# Patient Record
Sex: FEMALE | Race: BLACK OR AFRICAN AMERICAN | NOT HISPANIC OR LATINO | Employment: PART TIME | URBAN - METROPOLITAN AREA
[De-identification: names, ages, dates, MRNs, and addresses within clinical notes are randomized per-mention and may not be internally consistent; named-entity substitution may affect disease eponyms.]

---

## 2017-10-16 ENCOUNTER — OFFICE VISIT (OUTPATIENT)
Dept: INTERNAL MEDICINE | Facility: CLINIC | Age: 22
End: 2017-10-16
Payer: COMMERCIAL

## 2017-10-16 VITALS
WEIGHT: 128.31 LBS | HEIGHT: 66 IN | TEMPERATURE: 99 F | BODY MASS INDEX: 20.62 KG/M2 | HEART RATE: 60 BPM | DIASTOLIC BLOOD PRESSURE: 64 MMHG | SYSTOLIC BLOOD PRESSURE: 110 MMHG

## 2017-10-16 DIAGNOSIS — J02.9 PHARYNGITIS, UNSPECIFIED ETIOLOGY: Primary | ICD-10-CM

## 2017-10-16 LAB — DEPRECATED S PYO AG THROAT QL EIA: NEGATIVE

## 2017-10-16 PROCEDURE — 87147 CULTURE TYPE IMMUNOLOGIC: CPT

## 2017-10-16 PROCEDURE — 87880 STREP A ASSAY W/OPTIC: CPT

## 2017-10-16 PROCEDURE — 87081 CULTURE SCREEN ONLY: CPT

## 2017-10-16 PROCEDURE — 99203 OFFICE O/P NEW LOW 30 MIN: CPT | Mod: S$GLB,,, | Performed by: NURSE PRACTITIONER

## 2017-10-16 PROCEDURE — 99999 PR PBB SHADOW E&M-NEW PATIENT-LVL IV: CPT | Mod: PBBFAC,,, | Performed by: NURSE PRACTITIONER

## 2017-10-16 RX ORDER — CLINDAMYCIN HYDROCHLORIDE 300 MG/1
CAPSULE ORAL
Refills: 0 | COMMUNITY
Start: 2017-09-25 | End: 2017-10-18 | Stop reason: ALTCHOICE

## 2017-10-16 NOTE — PROGRESS NOTES
"Subjective:       Patient ID: Libia Smith is a 22 y.o. female.    Chief Complaint: Sore Throat    Pt here c/o sore throat x 2 days.  Pt states that she was recently treated about a month ago for strep through [facility].  Pt states at that time no culture obtained.  Pt placed on Clindamycin and states that she took all doses.  Pt states that she felt feverish 2d ago but did not have a thermometer to take her temp.  None today.  Some cough that started 2 days ago. Denies n/v/d.  Pt has been taking OTC Ibuprofen        Sore Throat    Associated symptoms include coughing. Pertinent negatives include no abdominal pain, congestion, diarrhea, neck pain, shortness of breath or vomiting.     No past medical history on file.  No past surgical history on file.  Social History     Social History Narrative    No narrative on file     No family history on file.  Vitals:    10/16/17 1418   BP: 110/64   Pulse: 60   Temp: 98.9 °F (37.2 °C)   Weight: 58.2 kg (128 lb 4.9 oz)   Height: 5' 6" (1.676 m)   PainSc:   8   PainLoc: Throat     Outpatient Encounter Prescriptions as of 10/16/2017   Medication Sig Dispense Refill    clindamycin (CLEOCIN) 300 MG capsule   0     No facility-administered encounter medications on file as of 10/16/2017.      Wt Readings from Last 3 Encounters:   10/16/17 58.2 kg (128 lb 4.9 oz)     Last 3 sets of Vitals    Vitals - 1 value per visit 10/16/2017   SYSTOLIC 110   DIASTOLIC 64   PULSE 60   TEMPERATURE 98.9   Weight (lb) 128.31   Weight (kg) 58.2   HEIGHT 5' 6"   BODY MASS INDEX 20.71   VISIT REPORT -   Pain Score  8     No results found for: CBC  Review of Systems   Constitutional: Positive for chills. Negative for fatigue, fever and unexpected weight change.   HENT: Positive for sore throat. Negative for congestion, nosebleeds and postnasal drip.    Respiratory: Positive for cough. Negative for chest tightness and shortness of breath.    Cardiovascular: Negative for chest pain. "   Gastrointestinal: Negative for abdominal pain, constipation, diarrhea, nausea and vomiting.   Musculoskeletal: Negative for arthralgias, myalgias, neck pain and neck stiffness.   Skin: Negative for rash.   Hematological: Negative for adenopathy.   Psychiatric/Behavioral: Negative for sleep disturbance.       Objective:      Physical Exam   Constitutional: She is oriented to person, place, and time. She appears well-developed and well-nourished. No distress.   HENT:   Head: Normocephalic.   Right Ear: External ear normal.   Left Ear: External ear normal.   Mouth/Throat: Uvula is midline, oropharynx is clear and moist and mucous membranes are normal. Tonsils are 2+ on the right. Tonsils are 2+ on the left.   Eyes: Conjunctivae are normal. Pupils are equal, round, and reactive to light. Right eye exhibits no discharge. Left eye exhibits no discharge.   Neck: Normal range of motion. Neck supple.   Cardiovascular: Normal rate, regular rhythm, normal heart sounds and intact distal pulses.    Pulmonary/Chest: Effort normal and breath sounds normal. No stridor. No respiratory distress.   Abdominal: Soft.   Lymphadenopathy:     She has cervical adenopathy.   Neurological: She is alert and oriented to person, place, and time.   Skin: Skin is warm and dry. Capillary refill takes less than 2 seconds. No rash noted. She is not diaphoretic. No erythema. No pallor.   Psychiatric: She has a normal mood and affect. Her behavior is normal. Judgment and thought content normal.   Nursing note and vitals reviewed.      Assessment:       1. Pharyngitis, unspecified etiology        Plan:       Recommended Ibuprofen 600 mg every 6-8 hours  Chloraseptic spray  Will call with results of tests  Libia was seen today for sore throat.    Diagnoses and all orders for this visit:    Pharyngitis, unspecified etiology  -     Throat Screen, Rapid      Patient Instructions     Pharyngitis (Sore Throat), Report Pending    Pharyngitis (sore throat) is  often due to a virus. It can also be caused by the streptococcus, or strep, bacterium, often called strep throat. Both viral and strep infections can cause throat pain that is worse when swallowing, aching all over with headache, and fever. Both types of infections are contagious. They may be spread by coughing, kissing, or touching others after touching your mouth or nose.  A test has been done to find out whether you (or your child, if your child is the patient) have strep throat. Call this facility or your healthcare provider if you were not given your test results. If the test is positive for strep infection, you will need to take antibiotic medicines. A prescription can be called into your pharmacy at that time. If the test is negative, you probably have a viral pharyngitis. This does not need to be treated with antibiotics. Until you receive the results of the strep test, you should stay home from work. If your child is being tested, he or she should stay home from school.  Home care  · Rest at home. Drink plenty of fluids so you won't get dehydrated.  · If the test is positive for strep, don't go to work or school for the first 2 days of taking the antibiotics. After this time, you will not be contagious. You can then return to work or school if you are feeling better.   · Take the antibiotic medicine for the full 10 days, even if you feel better. This is very important to make sure the infection is treated. It is also important to prevent drug-resistant germs from developing. If you were given an antibiotic shot, you won't need more antibiotics.  · For children: Use acetaminophen for fever, fussiness, or discomfort. In infants older than 6 months of age, you may use ibuprofen instead of acetaminophen. Talk with your child's healthcare provider before giving these medicines if your child has chronic liver or kidney disease or ever had a stomach ulcer or GI bleeding. Never give aspirin to a child under 18  years of age who is ill with a fever. It may cause severe liver damage.  · For adults: Use acetaminophen or ibuprofen to control pain or fever, unless another medicine was prescribed for this. Talk with your healthcare provider before taking these medicines if you have chronic liver or kidney disease or ever had a stomach ulcer or GI bleeding.  · Use throat lozenges or numbing throat sprays to help reduce pain. Gargling with warm salt water will also help reduce throat pain. For this, dissolve 1/2 teaspoon of salt in 1 glass of warm water. To help soothe a sore throat, children can sip on juice or a popsicle. Children 5 years and older can also suck on a lollipop or hard candy.  · Don't eat salty or spicy foods. These can irritate the throat.  Follow-up care  Follow up with your healthcare provider or our staff if you don't get better over the next week.  When to seek medical advice  Call your healthcare provider right away if any of these occur:  · Fever as directed by your healthcare provider. For children, seek care if:  ¨ Your child is of any age and has repeated fevers above 104°F (40°C).  ¨ Your child is younger than 2 years of age and has a fever of 100.4°F (38°C) that continues for more than 1 day.  ¨ Your child is 2 years old or older and has a fever of 100.4°F (38°C) that continues for more than 3 days.  · New or worsening ear pain, sinus pain, or headache  · Painful lumps in the back of neck  · Stiff neck  · Lymph nodes are getting larger  · Inability to swallow liquids, excessive drooling, or inability to open mouth wide due to throat pain  · Signs of dehydration (very dark urine or no urine, sunken eyes, dizziness)  · Trouble breathing or noisy breathing  · Muffled voice  · New rash  · Child appears to be getting sicker  Date Last Reviewed: 4/13/2015  © 0531-9933 [employer]. [address] [place] [address], [place] [address]. All rights reserved. This information is not intended as a substitute  for professional medical care. Always follow your healthcare professional's instructions.

## 2017-10-16 NOTE — PATIENT INSTRUCTIONS

## 2017-10-18 ENCOUNTER — OFFICE VISIT (OUTPATIENT)
Dept: FAMILY MEDICINE | Facility: CLINIC | Age: 22
End: 2017-10-18
Payer: COMMERCIAL

## 2017-10-18 VITALS
TEMPERATURE: 98 F | DIASTOLIC BLOOD PRESSURE: 62 MMHG | HEART RATE: 80 BPM | HEIGHT: 67 IN | SYSTOLIC BLOOD PRESSURE: 90 MMHG | BODY MASS INDEX: 19.93 KG/M2 | WEIGHT: 127 LBS

## 2017-10-18 DIAGNOSIS — Z23 NEED FOR PROPHYLACTIC VACCINATION AND INOCULATION AGAINST INFLUENZA: ICD-10-CM

## 2017-10-18 DIAGNOSIS — J30.89 ENVIRONMENTAL AND SEASONAL ALLERGIES: ICD-10-CM

## 2017-10-18 DIAGNOSIS — Z00.00 ANNUAL PHYSICAL EXAM: Primary | ICD-10-CM

## 2017-10-18 DIAGNOSIS — Z23 NEED FOR TDAP VACCINATION: ICD-10-CM

## 2017-10-18 DIAGNOSIS — J02.9 ACUTE PHARYNGITIS, UNSPECIFIED ETIOLOGY: ICD-10-CM

## 2017-10-18 LAB — BACTERIA THROAT CULT: NORMAL

## 2017-10-18 PROCEDURE — 90686 IIV4 VACC NO PRSV 0.5 ML IM: CPT | Mod: S$GLB,,, | Performed by: INTERNAL MEDICINE

## 2017-10-18 PROCEDURE — 90472 IMMUNIZATION ADMIN EACH ADD: CPT | Mod: S$GLB,,, | Performed by: INTERNAL MEDICINE

## 2017-10-18 PROCEDURE — 90471 IMMUNIZATION ADMIN: CPT | Mod: S$GLB,,, | Performed by: INTERNAL MEDICINE

## 2017-10-18 PROCEDURE — 99999 PR PBB SHADOW E&M-EST. PATIENT-LVL III: CPT | Mod: PBBFAC,,, | Performed by: INTERNAL MEDICINE

## 2017-10-18 PROCEDURE — 99395 PREV VISIT EST AGE 18-39: CPT | Mod: 25,S$GLB,, | Performed by: INTERNAL MEDICINE

## 2017-10-18 PROCEDURE — 90715 TDAP VACCINE 7 YRS/> IM: CPT | Mod: S$GLB,,, | Performed by: INTERNAL MEDICINE

## 2017-10-18 NOTE — PROGRESS NOTES
Two patient identifiers verified.  Allergies reviewed.Tdap given  IM administered to right deltoid and Flu vaccin given IM per MD order.  Patient tolerated injection well; no redness, bleeding, or bruising noted to injection site.  Patient instructed to remain in clinic setting for 15 minutes.  Verbalizes understanding.  Flu consent signed by patient.

## 2017-10-18 NOTE — PROGRESS NOTES
Subjective:        Patient ID: Libia Smith is a 22 y.o. female.    Chief Complaint: Establish Care    HPI   Libia Smith presents for annual exam and to establish care.    Pt was seen 2 days ago for acute pharyngitis.  She reports sore throat, congestion, pus coating both tonsils, tender swollen LNs in neck.  Sx are significantly improved today and it appears that the pus on the tonsils has decreased.  She has been taking OTC Ibuprofen and OTC supplements to treat her sx.    Review of Systems   Constitutional: Negative for activity change and unexpected weight change.   HENT: Positive for congestion, ear pain (right, acute associated with pharyngitis), sore throat and trouble swallowing. Negative for drooling and ear discharge.    Eyes: Negative for visual disturbance.   Respiratory: Positive for cough. Negative for chest tightness, shortness of breath and stridor.    Cardiovascular: Negative for chest pain and leg swelling.   Gastrointestinal: Negative for abdominal pain, blood in stool, constipation, diarrhea and vomiting.   Genitourinary: Negative for difficulty urinating, hematuria, menstrual problem (regular monthly periods) and vaginal discharge.   Musculoskeletal: Negative for neck pain.   Skin: Negative for rash.   Allergic/Immunologic: Positive for environmental allergies.   Neurological: Positive for headaches.   Psychiatric/Behavioral: Negative for dysphoric mood. The patient is not nervous/anxious.            Objective:        Vitals:    10/18/17 1012   BP: 90/62   Pulse: 80   Temp: 98 °F (36.7 °C)     Physical Exam   Constitutional: She is oriented to person, place, and time. She appears well-developed and well-nourished. No distress.   HENT:   Head: Normocephalic and atraumatic.   Right Ear: External ear normal.   Left Ear: External ear normal.   Nose: Nose normal.   - bilateral ear canals clear, tympanic membranes visualized - normal color and light reflex  - b/l tonsils 2+,  symmetrical, erythematous, minimal white exudate on L tonsil   Eyes: Conjunctivae and EOM are normal.   Neck: Normal range of motion. Neck supple. No thyromegaly present.   Cardiovascular: Normal rate, regular rhythm and normal heart sounds.    No murmur heard.  Pulmonary/Chest: Effort normal and breath sounds normal. No respiratory distress.   Abdominal: Soft. Bowel sounds are normal. She exhibits no distension and no mass. There is no tenderness. There is no guarding.   Musculoskeletal: She exhibits no edema.   Lymphadenopathy:     She has cervical adenopathy (multiple tender 1-2cm LNs in anterior cervical chain b/l).   Neurological: She is alert and oriented to person, place, and time.   Skin: Skin is warm and dry. No erythema.   - scaly patches on abdomen: periumbilical, LLQ; no erythema, skin intact   Psychiatric: She has a normal mood and affect. Her behavior is normal. Judgment and thought content normal.   Vitals reviewed.          Assessment:         1. Annual physical exam    2. Need for prophylactic vaccination and inoculation against influenza    3. Need for Tdap vaccination    4. Environmental and seasonal allergies    5. Acute pharyngitis, unspecified etiology              Plan:         Libia was seen today for establish care.    Diagnoses and all orders for this visit:    Annual physical exam  - flu shot and TDaP today  - Pt has never had HPV vaccine, discussed vaccinations and indications, gave pt handout with vaccine info and advised her to follow up if she's interested in getting the vaccine or has questions.  - Pt reports hx of pap smear ~1 year ago; reviewed cervical CA screening guidelines.  - pt young, healthy, no indications for routine lab work at this time.    Need for prophylactic vaccination and inoculation against influenza  -     Flu Vaccine - Quadrivalent (PF) (3 years & older)    Need for Tdap vaccination  -     Tdap Vaccine    Environmental and seasonal allergies: Pt treats with OTC  supplements including echinacea, nasal saline washes.    Acute pharyngitis, unspecified etiology: Improving.  Continue sx treatment; counseled pt LNs should resolve after pharyngitis resolves, if not follow up.          Return in 1  year for annual exam or sooner PRN.

## 2017-12-11 ENCOUNTER — OFFICE VISIT (OUTPATIENT)
Dept: INTERNAL MEDICINE | Facility: CLINIC | Age: 22
End: 2017-12-11
Payer: COMMERCIAL

## 2017-12-11 VITALS
HEART RATE: 68 BPM | BODY MASS INDEX: 20.9 KG/M2 | RESPIRATION RATE: 15 BRPM | SYSTOLIC BLOOD PRESSURE: 110 MMHG | WEIGHT: 130.06 LBS | HEIGHT: 66 IN | TEMPERATURE: 98 F | DIASTOLIC BLOOD PRESSURE: 60 MMHG

## 2017-12-11 DIAGNOSIS — Z87.09 HX OF EXTRINSIC ASTHMA: ICD-10-CM

## 2017-12-11 DIAGNOSIS — Z71.84 COUNSELING FOR TRAVEL: Primary | ICD-10-CM

## 2017-12-11 PROCEDURE — 99999 PR PBB SHADOW E&M-EST. PATIENT-LVL IV: CPT | Mod: PBBFAC,,, | Performed by: INTERNAL MEDICINE

## 2017-12-11 PROCEDURE — 99214 OFFICE O/P EST MOD 30 MIN: CPT | Mod: S$GLB,,, | Performed by: INTERNAL MEDICINE

## 2017-12-11 RX ORDER — ALBUTEROL SULFATE 90 UG/1
2 AEROSOL, METERED RESPIRATORY (INHALATION) EVERY 6 HOURS PRN
Qty: 18 G | Refills: 0 | Status: SHIPPED | OUTPATIENT
Start: 2017-12-11 | End: 2021-11-10

## 2017-12-11 NOTE — PROGRESS NOTES
Subjective:       Patient ID: Libia Smith is a 22 y.o. female.    Chief Complaint: forms to be completed    HPI   22 y.o. female here for study abroad physical.  She is traveling to [facility] for the spring semester.  She denies any current complaints.  She is wondering about vaccines needed for travel.  She had her annual with Dr. Calvo in October.      Chronic medical issues:  Asthma: no exacerbation in many years, denies any wheezing, SOB or Cp.  She is wondering about using a inhaler in [facility].      Health Maintenance    Vaccines: Influenza UTD  Tetanus 2017         Past Medical History:   Diagnosis Date    Environmental and seasonal allergies       History reviewed. No pertinent surgical history.  Social History     Social History    Marital status: Single     Spouse name: N/A    Number of children: N/A    Years of education: N/A     Occupational History    Not on file.     Social History Main Topics    Smoking status: Never Smoker    Smokeless tobacco: Never Used      Comment: Tried smoking kirby year of college    Alcohol use No    Drug use: Unknown    Sexual activity: Not on file     Other Topics Concern    Not on file     Social History Narrative    [employer] at [employer]    Originally from [place]    Single; no children    Regular exercise     Review of patient's allergies indicates:  No Known Allergies  Ms. Smith had no medications administered during this visit.          Review of Systems   Constitutional: Negative for activity change, chills, fever and unexpected weight change.   HENT: Negative for congestion, hearing loss, rhinorrhea, sinus pain, sinus pressure, sore throat and trouble swallowing.    Eyes: Negative for pain, discharge, redness and visual disturbance.   Respiratory: Negative for cough, chest tightness, shortness of breath and wheezing.    Cardiovascular: Negative for chest pain and palpitations.   Gastrointestinal: Negative for abdominal pain, blood in stool,  constipation, diarrhea, nausea and vomiting.   Endocrine: Negative for polydipsia and polyuria.   Genitourinary: Negative for difficulty urinating, dysuria, hematuria and menstrual problem.   Musculoskeletal: Negative for arthralgias, joint swelling and neck pain.   Skin: Negative for rash.   Neurological: Negative for dizziness, weakness, light-headedness and headaches.   Psychiatric/Behavioral: Negative for confusion, dysphoric mood and sleep disturbance.     Objective:     Vitals:    12/11/17 1431   BP: 110/60   Pulse: 68   Resp: 15   Temp: 98.2 °F (36.8 °C)    Body mass index is 20.99 kg/m².  Physical Exam   Constitutional: She is oriented to person, place, and time. She appears well-developed and well-nourished.   HENT:   Head: Normocephalic and atraumatic.   Mouth/Throat: Oropharynx is clear and moist.   Eyes: Conjunctivae are normal. Pupils are equal, round, and reactive to light.   Neck: Normal range of motion. No tracheal deviation present. No thyromegaly present.   Cardiovascular: Normal rate, regular rhythm, normal heart sounds and intact distal pulses.  Exam reveals no gallop and no friction rub.    No murmur heard.  Pulmonary/Chest: Effort normal and breath sounds normal. She has no wheezes. She has no rales.   Abdominal: Soft. Bowel sounds are normal. There is no tenderness. There is no guarding.   Musculoskeletal: She exhibits no edema.   Lymphadenopathy:     She has no cervical adenopathy.   Neurological: She is alert and oriented to person, place, and time.   Skin: Skin is warm and dry. No rash noted.   Psychiatric: She has a normal mood and affect. Judgment normal.     Assessment:     1. Counseling for travel    2. Hx of extrinsic asthma      Plan:   1. Counseling for travel  - Ambulatory Referral to Travel Clinic  - paperwork filled out for study abroad    2. Hx of extrinsic asthma  - albuterol 90 mcg/actuation inhaler; Inhale 2 puffs into the lungs every 6 (six) hours as needed for Wheezing.  Rescue  Dispense: 18 g; Refill: 0        No follow up on file

## 2017-12-11 NOTE — PATIENT INSTRUCTIONS
Healthy Eating While Traveling  Eating a healthy diet when you are traveling can be difficult. It is important to plan ahead. Below are strategies to help you.  Hotels  · Order healthy breakfast foods individually (yuli pepe). This will help you avoid the temptations at the breakfast buffet.  · Have breakfast in your room. Bring fresh fruit and mini-boxes of cereal. You can keep a small container of  low-fat or skim milk cool in the ice bucket overnight.  Car travel  If you have a long ride ahead, buy snacks before the trip. This will help you avoid the high-fat food choices on the road. Some good snack options include:  · Pre-washed fruit (apples, pears, bananas, or grapes)  · Whole-grain crackers or pretzels  · Whole-grain bagels  · Plain popcorn  · Fig bars  · Low-fat or nonfat yogurt  · Baby carrots or ready-cut raw veggies  · Dried fruit  · String cheese  · Unsalted, raw nuts or seeds  · Homemade trail mix  Airplane travel  · Think ahead. Pack healthy snacks that you can carry through security. Most of the items listed above can be packed in your carry-on bag.  · If you are not able to pack healthy options, buy healthy food at the terminal and bring it on the plane. In most airports you can find whole-grain sandwiches, pre-made salads, fruit cups, pre-cut veggies, cheese sticks, and fat-free lattes.  · Many airlines offer peanuts or pretzels. These are already pre-portioned to limit your serving. Pretzels have fewer calories. But peanuts have protein and healthy fats that may satisfy your hunger longer.  · Be smart when choosing a drink. Have water, low-fat milk, vegetable juice, or whole fruit juice instead of soda.  · Limit the amount of alcohol you drink on the plane. Alcohol provides calories with no nutrients. It can make you hungry and dehydrated. Try mineral water with a twist of lemon or lime instead.  Date Last Reviewed: 6/8/2015  © 5362-0382 [employer] [address] [facility],  [place] [address]. All rights reserved. This information is not intended as a substitute for professional medical care. Always follow your healthcare professional's instructions.

## 2017-12-11 NOTE — LETTER
December 11, 2017    Libia Smith  [facility] [facility] [address]             [facility]  [address] [facility] [address]  Phone: [phone]  Fax: [phone]     To whom it may concern:        This is a letter to certify that patient Libia Smith is in good health and cleared to study abroad.  I have performed complete history and physical.        Please contact me with any questions.        Sincerely,        Skylar Park MD

## 2017-12-27 ENCOUNTER — CLINICAL SUPPORT (OUTPATIENT)
Dept: INFECTIOUS DISEASES | Facility: CLINIC | Age: 22
End: 2017-12-27
Payer: COMMERCIAL

## 2017-12-27 ENCOUNTER — OFFICE VISIT (OUTPATIENT)
Dept: INFECTIOUS DISEASES | Facility: CLINIC | Age: 22
End: 2017-12-27
Payer: COMMERCIAL

## 2017-12-27 VITALS
SYSTOLIC BLOOD PRESSURE: 113 MMHG | BODY MASS INDEX: 20.76 KG/M2 | HEART RATE: 64 BPM | DIASTOLIC BLOOD PRESSURE: 65 MMHG | TEMPERATURE: 98 F | WEIGHT: 129.19 LBS | HEIGHT: 66 IN

## 2017-12-27 DIAGNOSIS — Z23 IMMUNIZATION DUE: ICD-10-CM

## 2017-12-27 DIAGNOSIS — Z71.84 TRAVEL ADVICE ENCOUNTER: ICD-10-CM

## 2017-12-27 DIAGNOSIS — Z71.84 TRAVEL ADVICE ENCOUNTER: Primary | ICD-10-CM

## 2017-12-27 PROCEDURE — 90471 IMMUNIZATION ADMIN: CPT | Mod: S$GLB,,, | Performed by: INTERNAL MEDICINE

## 2017-12-27 PROCEDURE — 90636 HEP A/HEP B VACC ADULT IM: CPT | Mod: S$GLB,,, | Performed by: INTERNAL MEDICINE

## 2017-12-27 PROCEDURE — 99402 PREV MED CNSL INDIV APPRX 30: CPT | Mod: 25,S$GLB,, | Performed by: INTERNAL MEDICINE

## 2017-12-27 PROCEDURE — 90472 IMMUNIZATION ADMIN EACH ADD: CPT | Mod: S$GLB,,, | Performed by: INTERNAL MEDICINE

## 2017-12-27 PROCEDURE — 90691 TYPHOID VACCINE IM: CPT | Mod: S$GLB,,, | Performed by: INTERNAL MEDICINE

## 2017-12-27 PROCEDURE — 99999 PR PBB SHADOW E&M-EST. PATIENT-LVL III: CPT | Mod: PBBFAC,,, | Performed by: INTERNAL MEDICINE

## 2017-12-27 PROCEDURE — 90707 MMR VACCINE SC: CPT | Mod: S$GLB,,, | Performed by: INTERNAL MEDICINE

## 2017-12-27 NOTE — PROGRESS NOTES
Subjective:      Chief Complaint:   Chief Complaint   Patient presents with    Travel Consult     [facility]     History of Present Illness    Patient  22 y.o. female who presents today for routine pretravel consultation.  The patient reports a past medical history of asthma (hasn't used her inhaler since she was a child).  The patient reports the following medication allergies; none.  The patient reports the following food allergies; none.  She is a vegetarian and thinks she is lactose intolerant.  The patient will be traveling to  [place] on February 25.  The patient will be at this destination for 4.5 months.  The patient will be doing a homestay in [facility] [facility].  The patient has travelled to the following other countries in the past; [place].  The patient reports that she never received her childhood vaccinations.  The patient reports receipt of the following travel related vaccinations; none.  The purpose of this trip is to take part in a semester abroad program.    Review of Systems   Constitutional: Negative.  Negative for fever.   All other systems reviewed and are negative.      Objective:   Physical Exam   Assessment:     Pre-Travel clinic assessment    Plan:   Patient specific risks:      She doesn't have any medical problems that would restrict her travel.    Destination specific risks:      -Infectious Disease risks:       Mosquito Borne pathogens:  Reviewed basic mosquito avoidance precautions including wearing long sleeve clothing and insect repellant.  She is not currently pregnant.  Discussed risks of zika virus with her.  Also discussed risk of dengue fever with her.  She will not be in a malaria endemic area.  She will not be in a yellow fever endemic area.     Food Borne pathogens:  Reviewed basic hand, food and water sanitation precautions.  Patient instructed to take hand  on their trip.  Will give typhoid IM vaccine today.  Will initiate hepatitis a/b vaccination series.     Blood  Borne Pathogens:  Will initiate hepatitis a/b vaccination series.     STDs:  Risk of STDs reviewed with the patient.  Condom use encouraged with any sexual act.     Routine:  She reports never receiving her childhood vaccinations.  Will give MMR vaccine today.  Will give menactra today.  I recommend that her PCP gives her the HPV vaccination series.

## 2017-12-27 NOTE — LETTER
December 30, 2017      Skylar Park MD  [address] [place] [address]           [facility]  [address]  [facility] [address]  Phone: [phone]  Fax: [phone]          Patient: Libia Smith   MR Number: [id number]   YOB: 1995   Date of Visit: 12/27/2017       Dear Dr. Skylar Park:    Thank you for referring Libia Smith to me for evaluation. Attached you will find relevant portions of my assessment and plan of care.    If you have questions, please do not hesitate to call me. I look forward to following Libia Smith along with you.    Sincerely,    Ranjit Chen MD    Enclosure  CC:  No Recipients    If you would like to receive this communication electronically, please contact [email or web address] or [phone] to request more information on [email or web address] Link access.    For providers and/or their staff who would like to refer a patient to [facility], please contact us through our one-stop-shop provider referral line, [facility], at [phone].    If you feel you have received this communication in error or would no longer like to receive these types of communications, please e-mail [email or web address]

## 2017-12-27 NOTE — PROGRESS NOTES
Pt received her travel vaccinations (MMR, Typhoid, and Twinrix). Pt tolerated injections well. Return appts already scheduled.

## 2018-01-03 ENCOUNTER — CLINICAL SUPPORT (OUTPATIENT)
Dept: INFECTIOUS DISEASES | Facility: CLINIC | Age: 23
End: 2018-01-03
Payer: COMMERCIAL

## 2018-01-03 DIAGNOSIS — Z23 IMMUNIZATION DUE: ICD-10-CM

## 2018-01-03 DIAGNOSIS — Z71.84 TRAVEL ADVICE ENCOUNTER: ICD-10-CM

## 2018-01-03 PROCEDURE — 90636 HEP A/HEP B VACC ADULT IM: CPT | Mod: S$GLB,,, | Performed by: INTERNAL MEDICINE

## 2018-01-03 PROCEDURE — 90734 MENACWYD/MENACWYCRM VACC IM: CPT | Mod: S$GLB,,, | Performed by: INTERNAL MEDICINE

## 2018-01-03 PROCEDURE — 90471 IMMUNIZATION ADMIN: CPT | Mod: S$GLB,,, | Performed by: INTERNAL MEDICINE

## 2018-01-03 PROCEDURE — 90472 IMMUNIZATION ADMIN EACH ADD: CPT | Mod: S$GLB,,, | Performed by: INTERNAL MEDICINE

## 2018-01-03 NOTE — PROGRESS NOTES
Pt received the Meingococcal and second dose of her accelerated Hepatitis A/B vaccination. Pt tolerated injections well. Return appts already scheduled. Pt left the unit in NAD.

## 2018-01-18 ENCOUNTER — CLINICAL SUPPORT (OUTPATIENT)
Dept: INFECTIOUS DISEASES | Facility: CLINIC | Age: 23
End: 2018-01-18
Payer: COMMERCIAL

## 2018-01-18 DIAGNOSIS — Z71.84 TRAVEL ADVICE ENCOUNTER: ICD-10-CM

## 2018-01-18 DIAGNOSIS — Z23 IMMUNIZATION DUE: ICD-10-CM

## 2018-01-18 PROCEDURE — 90636 HEP A/HEP B VACC ADULT IM: CPT | Mod: S$GLB,,, | Performed by: INTERNAL MEDICINE

## 2018-01-18 PROCEDURE — 90471 IMMUNIZATION ADMIN: CPT | Mod: S$GLB,,, | Performed by: INTERNAL MEDICINE

## 2018-01-18 NOTE — PROGRESS NOTES
Pt received the third dose of her accelerated Hepatitis A/B vaccination. Pt tolerated the injection well. Pt left the unit in NAD. Return appt already scheduled.

## 2018-12-27 ENCOUNTER — CLINICAL SUPPORT (OUTPATIENT)
Dept: INFECTIOUS DISEASES | Facility: CLINIC | Age: 23
End: 2018-12-27

## 2018-12-27 DIAGNOSIS — Z71.84 TRAVEL ADVICE ENCOUNTER: ICD-10-CM

## 2018-12-27 DIAGNOSIS — Z23 IMMUNIZATION DUE: ICD-10-CM

## 2018-12-27 PROCEDURE — 99999 PR PBB SHADOW E&M-EST. PATIENT-LVL I: CPT | Mod: PBBFAC,,,

## 2018-12-27 PROCEDURE — 90636 HEP A/HEP B VACC ADULT IM: CPT | Mod: PBBFAC

## 2018-12-27 NOTE — PROGRESS NOTES
Ms. Smith has received her fourth dose of Twinrix (Hep A/B).  Patient tolerated well.  Patient left unit in NAD.

## 2020-06-15 ENCOUNTER — TELEPHONE (OUTPATIENT)
Dept: INFECTIOUS DISEASES | Facility: CLINIC | Age: 25
End: 2020-06-15

## 2020-06-15 NOTE — TELEPHONE ENCOUNTER
Spoke with pt by phone and after explained she was not due for an MMR because she had one back in 2017 at [facility]. She requested a copy of her immunizations be sent out to her.

## 2020-06-22 ENCOUNTER — TELEPHONE (OUTPATIENT)
Dept: INFECTIOUS DISEASES | Facility: CLINIC | Age: 25
End: 2020-06-22

## 2020-06-29 ENCOUNTER — CLINICAL SUPPORT (OUTPATIENT)
Dept: INFECTIOUS DISEASES | Facility: CLINIC | Age: 25
End: 2020-06-29
Payer: MEDICAID

## 2020-06-29 DIAGNOSIS — Z23 NEED FOR MMR VACCINE: Primary | ICD-10-CM

## 2020-06-29 DIAGNOSIS — Z23 NEED FOR MMR VACCINE: ICD-10-CM

## 2020-06-29 PROCEDURE — 90707 MMR VACCINE SC: CPT | Mod: PBBFAC

## 2021-01-27 ENCOUNTER — OFFICE VISIT (OUTPATIENT)
Dept: OBSTETRICS AND GYNECOLOGY | Facility: CLINIC | Age: 26
End: 2021-01-27
Payer: MEDICAID

## 2021-01-27 ENCOUNTER — LAB VISIT (OUTPATIENT)
Dept: LAB | Facility: HOSPITAL | Age: 26
End: 2021-01-27
Attending: OBSTETRICS & GYNECOLOGY
Payer: MEDICAID

## 2021-01-27 VITALS — DIASTOLIC BLOOD PRESSURE: 78 MMHG | BODY MASS INDEX: 21.39 KG/M2 | WEIGHT: 132.5 LBS | SYSTOLIC BLOOD PRESSURE: 122 MMHG

## 2021-01-27 DIAGNOSIS — Z12.4 ENCOUNTER FOR PAPANICOLAOU SMEAR FOR CERVICAL CANCER SCREENING: ICD-10-CM

## 2021-01-27 DIAGNOSIS — Z11.3 SCREEN FOR STD (SEXUALLY TRANSMITTED DISEASE): ICD-10-CM

## 2021-01-27 DIAGNOSIS — Z01.419 WELL WOMAN EXAM WITH ROUTINE GYNECOLOGICAL EXAM: Primary | ICD-10-CM

## 2021-01-27 PROCEDURE — 99385 PR PREVENTIVE VISIT,NEW,18-39: ICD-10-PCS | Mod: S$PBB,,, | Performed by: OBSTETRICS & GYNECOLOGY

## 2021-01-27 PROCEDURE — 87624 HPV HI-RISK TYP POOLED RSLT: CPT

## 2021-01-27 PROCEDURE — 99999 PR PBB SHADOW E&M-EST. PATIENT-LVL II: ICD-10-PCS | Mod: PBBFAC,,, | Performed by: OBSTETRICS & GYNECOLOGY

## 2021-01-27 PROCEDURE — 99212 OFFICE O/P EST SF 10 MIN: CPT | Mod: PBBFAC | Performed by: OBSTETRICS & GYNECOLOGY

## 2021-01-27 PROCEDURE — 87510 GARDNER VAG DNA DIR PROBE: CPT

## 2021-01-27 PROCEDURE — 88141 CYTOPATH C/V INTERPRET: CPT | Mod: ,,, | Performed by: PATHOLOGY

## 2021-01-27 PROCEDURE — 36415 COLL VENOUS BLD VENIPUNCTURE: CPT

## 2021-01-27 PROCEDURE — 88141 PR  CYTOPATH CERV/VAG INTERPRET: ICD-10-PCS | Mod: ,,, | Performed by: PATHOLOGY

## 2021-01-27 PROCEDURE — 80074 ACUTE HEPATITIS PANEL: CPT

## 2021-01-27 PROCEDURE — 88175 CYTOPATH C/V AUTO FLUID REDO: CPT | Performed by: PATHOLOGY

## 2021-01-27 PROCEDURE — 99999 PR PBB SHADOW E&M-EST. PATIENT-LVL II: CPT | Mod: PBBFAC,,, | Performed by: OBSTETRICS & GYNECOLOGY

## 2021-01-27 PROCEDURE — 86592 SYPHILIS TEST NON-TREP QUAL: CPT

## 2021-01-27 PROCEDURE — 99385 PREV VISIT NEW AGE 18-39: CPT | Mod: S$PBB,,, | Performed by: OBSTETRICS & GYNECOLOGY

## 2021-01-27 PROCEDURE — 86703 HIV-1/HIV-2 1 RESULT ANTBDY: CPT

## 2021-01-27 PROCEDURE — 87660 TRICHOMONAS VAGIN DIR PROBE: CPT

## 2021-01-28 LAB
HIV 1+2 AB+HIV1 P24 AG SERPL QL IA: NEGATIVE
RPR SER QL: NORMAL

## 2021-01-29 LAB
CANDIDA RRNA VAG QL PROBE: NOT DETECTED
G VAGINALIS RRNA GENITAL QL PROBE: DETECTED
HAV IGM SERPL QL IA: NEGATIVE
HBV CORE IGM SERPL QL IA: NEGATIVE
HBV SURFACE AG SERPL QL IA: NEGATIVE
HCV AB SERPL QL IA: NEGATIVE
T VAGINALIS RRNA GENITAL QL PROBE: NOT DETECTED

## 2021-02-01 RX ORDER — METRONIDAZOLE 500 MG/1
500 TABLET ORAL EVERY 12 HOURS
Qty: 14 TABLET | Refills: 0 | Status: SHIPPED | OUTPATIENT
Start: 2021-02-01 | End: 2021-02-08

## 2021-02-25 LAB
FINAL PATHOLOGIC DIAGNOSIS: ABNORMAL
Lab: ABNORMAL

## 2021-03-01 LAB
HPV HR 12 DNA SPEC QL NAA+PROBE: POSITIVE
HPV16 AG SPEC QL: NEGATIVE
HPV18 DNA SPEC QL NAA+PROBE: NEGATIVE

## 2021-11-10 ENCOUNTER — OFFICE VISIT (OUTPATIENT)
Dept: OBSTETRICS AND GYNECOLOGY | Facility: CLINIC | Age: 26
End: 2021-11-10
Payer: MEDICAID

## 2021-11-10 VITALS
DIASTOLIC BLOOD PRESSURE: 62 MMHG | BODY MASS INDEX: 21.55 KG/M2 | SYSTOLIC BLOOD PRESSURE: 100 MMHG | HEIGHT: 66 IN | WEIGHT: 134.06 LBS

## 2021-11-10 DIAGNOSIS — R87.810 ASCUS WITH POSITIVE HIGH RISK HPV CERVICAL: ICD-10-CM

## 2021-11-10 DIAGNOSIS — K64.9 HEMORRHOIDS, UNSPECIFIED HEMORRHOID TYPE: Primary | ICD-10-CM

## 2021-11-10 DIAGNOSIS — Z11.3 SCREEN FOR STD (SEXUALLY TRANSMITTED DISEASE): ICD-10-CM

## 2021-11-10 DIAGNOSIS — Z30.016 ENCOUNTER FOR INITIAL PRESCRIPTION OF TRANSDERMAL PATCH HORMONAL CONTRACEPTIVE DEVICE: ICD-10-CM

## 2021-11-10 DIAGNOSIS — R87.610 ASCUS WITH POSITIVE HIGH RISK HPV CERVICAL: ICD-10-CM

## 2021-11-10 PROCEDURE — 99214 OFFICE O/P EST MOD 30 MIN: CPT | Mod: S$PBB,,, | Performed by: PHYSICIAN ASSISTANT

## 2021-11-10 PROCEDURE — 87591 N.GONORRHOEAE DNA AMP PROB: CPT | Mod: 59 | Performed by: PHYSICIAN ASSISTANT

## 2021-11-10 PROCEDURE — 99999 PR PBB SHADOW E&M-EST. PATIENT-LVL III: ICD-10-PCS | Mod: PBBFAC,,, | Performed by: PHYSICIAN ASSISTANT

## 2021-11-10 PROCEDURE — 99213 OFFICE O/P EST LOW 20 MIN: CPT | Mod: PBBFAC | Performed by: PHYSICIAN ASSISTANT

## 2021-11-10 PROCEDURE — 99214 PR OFFICE/OUTPT VISIT, EST, LEVL IV, 30-39 MIN: ICD-10-PCS | Mod: S$PBB,,, | Performed by: PHYSICIAN ASSISTANT

## 2021-11-10 PROCEDURE — 87481 CANDIDA DNA AMP PROBE: CPT | Mod: 59 | Performed by: PHYSICIAN ASSISTANT

## 2021-11-10 PROCEDURE — 99999 PR PBB SHADOW E&M-EST. PATIENT-LVL III: CPT | Mod: PBBFAC,,, | Performed by: PHYSICIAN ASSISTANT

## 2021-11-10 PROCEDURE — 87491 CHLMYD TRACH DNA AMP PROBE: CPT | Performed by: PHYSICIAN ASSISTANT

## 2021-11-10 RX ORDER — HYDROCORTISONE ACETATE 25 MG/1
25 SUPPOSITORY RECTAL 2 TIMES DAILY PRN
Qty: 14 SUPPOSITORY | Refills: 1 | Status: SHIPPED | OUTPATIENT
Start: 2021-11-10 | End: 2022-11-10

## 2021-11-10 RX ORDER — CETIRIZINE HYDROCHLORIDE 10 MG/1
10 TABLET ORAL DAILY
COMMUNITY
Start: 2021-10-10 | End: 2022-09-08

## 2021-11-10 RX ORDER — TRIAMCINOLONE ACETONIDE 1 MG/G
CREAM TOPICAL DAILY
COMMUNITY
Start: 2021-10-10 | End: 2022-09-08

## 2021-11-10 RX ORDER — HYDROCORTISONE 25 MG/G
CREAM TOPICAL DAILY
COMMUNITY
Start: 2021-07-19 | End: 2023-02-16

## 2021-11-10 RX ORDER — NORELGESTROMIN AND ETHINYL ESTRADIOL 35; 150 UG/MG; UG/MG
1 PATCH TRANSDERMAL WEEKLY
Qty: 4 PATCH | Refills: 11 | Status: SHIPPED | OUTPATIENT
Start: 2021-11-10 | End: 2022-01-20

## 2021-11-12 LAB
C TRACH DNA SPEC QL NAA+PROBE: NOT DETECTED
N GONORRHOEA DNA SPEC QL NAA+PROBE: NOT DETECTED

## 2021-11-17 LAB
BACTERIAL VAGINOSIS DNA: NEGATIVE
CANDIDA GLABRATA DNA: NEGATIVE
CANDIDA KRUSEI DNA: NEGATIVE
CANDIDA RRNA VAG QL PROBE: NEGATIVE
T VAGINALIS RRNA GENITAL QL PROBE: NEGATIVE

## 2021-12-08 ENCOUNTER — PROCEDURE VISIT (OUTPATIENT)
Dept: OBSTETRICS AND GYNECOLOGY | Facility: CLINIC | Age: 26
End: 2021-12-08
Payer: MEDICAID

## 2021-12-08 VITALS
BODY MASS INDEX: 22.13 KG/M2 | SYSTOLIC BLOOD PRESSURE: 114 MMHG | WEIGHT: 137.13 LBS | DIASTOLIC BLOOD PRESSURE: 26 MMHG

## 2021-12-08 DIAGNOSIS — R87.610 ASCUS WITH POSITIVE HIGH RISK HPV CERVICAL: Primary | ICD-10-CM

## 2021-12-08 DIAGNOSIS — R87.810 ASCUS WITH POSITIVE HIGH RISK HPV CERVICAL: Primary | ICD-10-CM

## 2021-12-08 PROCEDURE — 88305 TISSUE EXAM BY PATHOLOGIST: CPT | Mod: 26,,, | Performed by: PATHOLOGY

## 2021-12-08 PROCEDURE — 57454 BX/CURETT OF CERVIX W/SCOPE: CPT | Mod: PBBFAC | Performed by: OBSTETRICS & GYNECOLOGY

## 2021-12-08 PROCEDURE — 88305 TISSUE EXAM BY PATHOLOGIST: CPT | Mod: 59 | Performed by: PATHOLOGY

## 2021-12-08 PROCEDURE — 88305 TISSUE EXAM BY PATHOLOGIST: ICD-10-PCS | Mod: 26,,, | Performed by: PATHOLOGY

## 2021-12-08 PROCEDURE — 57454 COLPOSCOPY: ICD-10-PCS | Mod: S$PBB,,, | Performed by: OBSTETRICS & GYNECOLOGY

## 2021-12-13 LAB
FINAL PATHOLOGIC DIAGNOSIS: NORMAL
GROSS: NORMAL
Lab: NORMAL

## 2021-12-14 ENCOUNTER — TELEPHONE (OUTPATIENT)
Dept: OBSTETRICS AND GYNECOLOGY | Facility: CLINIC | Age: 26
End: 2021-12-14
Payer: MEDICAID

## 2022-01-20 ENCOUNTER — OFFICE VISIT (OUTPATIENT)
Dept: OBSTETRICS AND GYNECOLOGY | Facility: CLINIC | Age: 27
End: 2022-01-20
Payer: MEDICAID

## 2022-01-20 VITALS
WEIGHT: 136.44 LBS | BODY MASS INDEX: 22.03 KG/M2 | DIASTOLIC BLOOD PRESSURE: 60 MMHG | SYSTOLIC BLOOD PRESSURE: 112 MMHG

## 2022-01-20 DIAGNOSIS — N87.0 DYSPLASIA OF CERVIX, LOW GRADE (CIN 1): ICD-10-CM

## 2022-01-20 DIAGNOSIS — Z30.016 ENCOUNTER FOR INITIAL PRESCRIPTION OF TRANSDERMAL PATCH HORMONAL CONTRACEPTIVE DEVICE: ICD-10-CM

## 2022-01-20 DIAGNOSIS — Z01.419 WELL WOMAN EXAM WITH ROUTINE GYNECOLOGICAL EXAM: Primary | ICD-10-CM

## 2022-01-20 PROCEDURE — 1160F RVW MEDS BY RX/DR IN RCRD: CPT | Mod: CPTII,,, | Performed by: OBSTETRICS & GYNECOLOGY

## 2022-01-20 PROCEDURE — 99395 PR PREVENTIVE VISIT,EST,18-39: ICD-10-PCS | Mod: S$PBB,,, | Performed by: OBSTETRICS & GYNECOLOGY

## 2022-01-20 PROCEDURE — 3078F PR MOST RECENT DIASTOLIC BLOOD PRESSURE < 80 MM HG: ICD-10-PCS | Mod: CPTII,,, | Performed by: OBSTETRICS & GYNECOLOGY

## 2022-01-20 PROCEDURE — 99999 PR PBB SHADOW E&M-EST. PATIENT-LVL III: ICD-10-PCS | Mod: PBBFAC,,, | Performed by: OBSTETRICS & GYNECOLOGY

## 2022-01-20 PROCEDURE — 99999 PR PBB SHADOW E&M-EST. PATIENT-LVL III: CPT | Mod: PBBFAC,,, | Performed by: OBSTETRICS & GYNECOLOGY

## 2022-01-20 PROCEDURE — 1159F MED LIST DOCD IN RCRD: CPT | Mod: CPTII,,, | Performed by: OBSTETRICS & GYNECOLOGY

## 2022-01-20 PROCEDURE — 3008F PR BODY MASS INDEX (BMI) DOCUMENTED: ICD-10-PCS | Mod: CPTII,,, | Performed by: OBSTETRICS & GYNECOLOGY

## 2022-01-20 PROCEDURE — 99395 PREV VISIT EST AGE 18-39: CPT | Mod: S$PBB,,, | Performed by: OBSTETRICS & GYNECOLOGY

## 2022-01-20 PROCEDURE — 1159F PR MEDICATION LIST DOCUMENTED IN MEDICAL RECORD: ICD-10-PCS | Mod: CPTII,,, | Performed by: OBSTETRICS & GYNECOLOGY

## 2022-01-20 PROCEDURE — 3074F PR MOST RECENT SYSTOLIC BLOOD PRESSURE < 130 MM HG: ICD-10-PCS | Mod: CPTII,,, | Performed by: OBSTETRICS & GYNECOLOGY

## 2022-01-20 PROCEDURE — 3078F DIAST BP <80 MM HG: CPT | Mod: CPTII,,, | Performed by: OBSTETRICS & GYNECOLOGY

## 2022-01-20 PROCEDURE — 3074F SYST BP LT 130 MM HG: CPT | Mod: CPTII,,, | Performed by: OBSTETRICS & GYNECOLOGY

## 2022-01-20 PROCEDURE — 1160F PR REVIEW ALL MEDS BY PRESCRIBER/CLIN PHARMACIST DOCUMENTED: ICD-10-PCS | Mod: CPTII,,, | Performed by: OBSTETRICS & GYNECOLOGY

## 2022-01-20 PROCEDURE — 3008F BODY MASS INDEX DOCD: CPT | Mod: CPTII,,, | Performed by: OBSTETRICS & GYNECOLOGY

## 2022-01-20 PROCEDURE — 99213 OFFICE O/P EST LOW 20 MIN: CPT | Mod: PBBFAC | Performed by: OBSTETRICS & GYNECOLOGY

## 2022-01-20 RX ORDER — NORELGESTROMIN AND ETHINYL ESTRADIOL 35; 150 UG/MG; UG/MG
1 PATCH TRANSDERMAL WEEKLY
Qty: 4 PATCH | Refills: 11 | Status: SHIPPED | OUTPATIENT
Start: 2022-01-20 | End: 2023-02-16

## 2022-01-20 NOTE — PROGRESS NOTES
SUBJECTIVE:   Libia Smith is a 26 y.o. female No obstetric history on file.  for annual well woman exam. Patient's last menstrual period was 12/18/2021..  She has no unusual complaints.      Contraception: none, would like to start on contraceptive patches      Past Medical History:   Diagnosis Date    Environmental and seasonal allergies      History reviewed. No pertinent surgical history.  Social History     Socioeconomic History    Marital status: Single   Tobacco Use    Smoking status: Never Smoker    Smokeless tobacco: Never Used    Tobacco comment: Tried smoking kirby year of college   Substance and Sexual Activity    Alcohol use: No   Social History Narrative    Kirby at [employer]    Originally from [facility]    Single; no children    Regular exercise     Family History   Problem Relation Age of Onset    Hypertension Mother     No Known Problems Father      OB History   Obstetric Comments   Gynhx: reg/4-5. NL cramp   Denies std   H/o abnl pap in 2019,    Pap 1/2021 ascus/hpv positive, colpo 12/2021 ROBINA 1   S/p gardasil         Current Outpatient Medications   Medication Sig Dispense Refill    cetirizine (ZYRTEC) 10 MG tablet Take 10 mg by mouth once daily.      hydrocortisone (ANUSOL-HC) 25 mg suppository Place 1 suppository (25 mg total) rectally 2 (two) times daily as needed for Hemorrhoids. 14 suppository 1    hydrocortisone 2.5 % cream Apply topically once daily.      triamcinolone acetonide 0.1% (KENALOG) 0.1 % cream Apply topically once daily.       No current facility-administered medications for this visit.     Allergies: Patient has no known allergies.       ROS:  GENERAL: Denies weight gain or weight loss. Feeling well overall.   SKIN: Denies rash or lesions.   HEAD: Denies head injury or headache.   NODES: Denies enlarged lymph nodes.   CHEST: Denies chest pain or shortness of breath.   CARDIOVASCULAR: Denies palpitations or left sided chest pain.   ABDOMEN: No abdominal pain,  constipation, diarrhea, nausea, vomiting or rectal bleeding.   URINARY: No frequency, dysuria, hematuria, or burning on urination.  REPRODUCTIVE: Denies vaginal discharge, abnormal vaginal bleeding, lesions, pelvic pain  BREASTS: The patient performs breast self-examination and denies pain, lumps, or nipple discharge.   HEMATOLOGIC: No easy bruisability or excessive bleeding.  MUSCULOSKELETAL: Denies joint pain or swelling.   NEUROLOGIC: Denies syncope or weakness.   PSYCHIATRIC: Denies depression, anxiety or mood swings.      OBJECTIVE:   /60   Wt 61.9 kg (136 lb 7.4 oz)   LMP 12/18/2021   BMI 22.03 kg/m²   The patient appears well, alert, oriented x 3, in no distress.  PSYCH:  Normal, full range of affect  NECK: negative, no thyromegaly, trachea midline  SKIN: normal, good color, good turgor and no acne, striae, hirsutism  BREAST EXAM: breasts appear normal, no suspicious masses, no skin or nipple changes or axillary nodes  ABDOMEN: soft, non-tender; bowel sounds normal; no masses,  no organomegaly and no hernias, masses, or hepatosplenomegaly  GENITALIA: normal external genitalia, no erythema, no discharge  BLADDER: soft  URETHRA: normal appearing urethra with no masses, tenderness or lesions and normal urethra, normal urethral meatus  VAGINA: Normal  CERVIX: no lesions or cervical motion tenderness  UTERUS: normal size, contour, position, consistency, mobility, non-tender  ADNEXA: no mass, fullness, tenderness      ASSESSMENT:   1. Health maintenance  -colpo 12/2021 ROBINA 1: repeat pap in 1 year  -counseled on exercise and healthy diet, weight loss  -bone health:  Discussed Vitamin D and Calcium supplementation, weight bearing exercises  2.  Discussed safety at home/school/work, healthy and balanced diet, sleep hygiene, as well as violence/weapons exposure.   3.  Contraception: rx for contraceptive patches sent.  Advise to start on first Sunday after her next period. Use back up with condoms x 2-4 weeks

## 2022-08-29 ENCOUNTER — OFFICE VISIT (OUTPATIENT)
Dept: PRIMARY CARE CLINIC | Facility: CLINIC | Age: 27
End: 2022-08-29
Payer: MEDICAID

## 2022-08-29 DIAGNOSIS — Z91.199 NO-SHOW FOR APPOINTMENT: Primary | ICD-10-CM

## 2022-08-29 PROCEDURE — 1159F MED LIST DOCD IN RCRD: CPT | Mod: CPTII,95,, | Performed by: NURSE PRACTITIONER

## 2022-08-29 PROCEDURE — 1160F RVW MEDS BY RX/DR IN RCRD: CPT | Mod: CPTII,95,, | Performed by: NURSE PRACTITIONER

## 2022-08-29 PROCEDURE — 99499 UNLISTED E&M SERVICE: CPT | Mod: 95,,, | Performed by: NURSE PRACTITIONER

## 2022-08-29 PROCEDURE — 1160F PR REVIEW ALL MEDS BY PRESCRIBER/CLIN PHARMACIST DOCUMENTED: ICD-10-PCS | Mod: CPTII,95,, | Performed by: NURSE PRACTITIONER

## 2022-08-29 PROCEDURE — 99499 NO LOS: ICD-10-PCS | Mod: 95,,, | Performed by: NURSE PRACTITIONER

## 2022-08-29 PROCEDURE — 1159F PR MEDICATION LIST DOCUMENTED IN MEDICAL RECORD: ICD-10-PCS | Mod: CPTII,95,, | Performed by: NURSE PRACTITIONER

## 2022-08-29 NOTE — PROGRESS NOTES
The patient location is: [place].  The chief complaint leading to consultation is: NO LOS.    Visit type: audiovisual    Face to Face time with patient: 5  10 minutes of total time spent on the encounter, which includes face to face time and non-face to face time preparing to see the patient (eg, review of tests), Obtaining and/or reviewing separately obtained history, Documenting clinical information in the electronic or other health record, Independently interpreting results (not separately reported) and communicating results to the patient/family/caregiver, or Care coordination (not separately reported).         Each patient to whom he or she provides medical services by telemedicine is:  (1) informed of the relationship between the physician and patient and the respective role of any other health care provider with respect to management of the patient; and (2) notified that he or she may decline to receive medical services by telemedicine and may withdraw from such care at any time.    Notes:   Subjective:       Patient ID: Libia Smith is a 27 y.o. female.    Chief Complaint: No chief complaint on file.    Patient has not gone for appointment, upon signing on patient signed out.  Patient called, asked to sign back on for encounter.  Patient stated that she would, patient never signed back on to complete visit.      Past Medical History:   Diagnosis Date    Environmental and seasonal allergies         No past surgical history on file.     Family History   Problem Relation Age of Onset    Hypertension Mother     No Known Problems Father        Social History     Tobacco Use   Smoking Status Never   Smokeless Tobacco Never   Tobacco Comments    Tried smoking kirby year of college       Social History     Social History Narrative    Kirby at [employer]    Originally from [facility]    Single; no children    Regular exercise       Review of patient's allergies indicates:  No Known Allergies     Review of Systems       Objective:        There were no vitals filed for this visit.     Physical Exam    Assessment:       No diagnosis found.    Plan:       There are no diagnoses linked to this encounter.   Medication List with Changes/Refills   Current Medications    CETIRIZINE (ZYRTEC) 10 MG TABLET    Take 10 mg by mouth once daily.    HYDROCORTISONE (ANUSOL-HC) 25 MG SUPPOSITORY    Place 1 suppository (25 mg total) rectally 2 (two) times daily as needed for Hemorrhoids.    HYDROCORTISONE 2.5 % CREAM    Apply topically once daily.    NORELGESTROMIN-ETHINYL ESTRADIOL (ORTHO EVRA) 150-35 MCG/24 HR    Place 1 patch onto the skin once a week.    TRIAMCINOLONE ACETONIDE 0.1% (KENALOG) 0.1 % CREAM    Apply topically once daily.            No follow-ups on file.    MATI Arredondo, MSN, FNP-C

## 2022-08-30 ENCOUNTER — OFFICE VISIT (OUTPATIENT)
Dept: PRIMARY CARE CLINIC | Facility: CLINIC | Age: 27
End: 2022-08-30
Payer: MEDICAID

## 2022-08-30 ENCOUNTER — PATIENT MESSAGE (OUTPATIENT)
Dept: PRIMARY CARE CLINIC | Facility: CLINIC | Age: 27
End: 2022-08-30

## 2022-08-30 DIAGNOSIS — Z53.21 PATIENT LEFT WITHOUT BEING SEEN: Primary | ICD-10-CM

## 2022-08-30 PROCEDURE — 99499 UNLISTED E&M SERVICE: CPT | Mod: 95,,, | Performed by: NURSE PRACTITIONER

## 2022-08-30 PROCEDURE — 99499 NO LOS: ICD-10-PCS | Mod: 95,,, | Performed by: NURSE PRACTITIONER

## 2022-08-30 NOTE — PROGRESS NOTES
Subjective:       Patient ID: Libia Smith is a 27 y.o. female.    Chief Complaint: No chief complaint on file.    HPI    Past Medical History:   Diagnosis Date    Environmental and seasonal allergies         No past surgical history on file.     Family History   Problem Relation Age of Onset    Hypertension Mother     No Known Problems Father        Social History     Tobacco Use   Smoking Status Never   Smokeless Tobacco Never   Tobacco Comments    Tried smoking kirby year of college       Social History     Social History Narrative    Kirby at [employer]    Originally from [facility]    Single; no children    Regular exercise       Review of patient's allergies indicates:  No Known Allergies     Review of Systems      Objective:        There were no vitals filed for this visit.     Physical Exam    Assessment:       No diagnosis found.    Plan:       There are no diagnoses linked to this encounter.   Medication List with Changes/Refills   Current Medications    CETIRIZINE (ZYRTEC) 10 MG TABLET    Take 10 mg by mouth once daily.    HYDROCORTISONE (ANUSOL-HC) 25 MG SUPPOSITORY    Place 1 suppository (25 mg total) rectally 2 (two) times daily as needed for Hemorrhoids.    HYDROCORTISONE 2.5 % CREAM    Apply topically once daily.    NORELGESTROMIN-ETHINYL ESTRADIOL (ORTHO EVRA) 150-35 MCG/24 HR    Place 1 patch onto the skin once a week.    TRIAMCINOLONE ACETONIDE 0.1% (KENALOG) 0.1 % CREAM    Apply topically once daily.            No follow-ups on file.    MATI Arredondo, MSN, FNP-C

## 2022-08-30 NOTE — PROGRESS NOTES
The patient location is:   The chief complaint leading to consultation is:     Visit type: audiovisual    Face to Face time with patient:    minutes of total time spent on the encounter, which includes face to face time and non-face to face time preparing to see the patient (eg, review of tests), Obtaining and/or reviewing separately obtained history, Documenting clinical information in the electronic or other health record, Independently interpreting results (not separately reported) and communicating results to the patient/family/caregiver, or Care coordination (not separately reported).         Each patient to whom he or she provides medical services by telemedicine is:  (1) informed of the relationship between the physician and patient and the respective role of any other health care provider with respect to management of the patient; and (2) notified that he or she may decline to receive medical services by telemedicine and may withdraw from such care at any time.    Notes:

## 2022-09-08 ENCOUNTER — OFFICE VISIT (OUTPATIENT)
Dept: PRIMARY CARE CLINIC | Facility: CLINIC | Age: 27
End: 2022-09-08
Payer: MEDICAID

## 2022-09-08 VITALS
DIASTOLIC BLOOD PRESSURE: 70 MMHG | WEIGHT: 136.56 LBS | SYSTOLIC BLOOD PRESSURE: 92 MMHG | BODY MASS INDEX: 21.95 KG/M2 | HEART RATE: 69 BPM | HEIGHT: 66 IN | TEMPERATURE: 99 F | OXYGEN SATURATION: 100 %

## 2022-09-08 DIAGNOSIS — J02.9 ACUTE PHARYNGITIS, UNSPECIFIED ETIOLOGY: Primary | ICD-10-CM

## 2022-09-08 LAB
CTP QC/QA: YES
S PYO RRNA THROAT QL PROBE: NEGATIVE

## 2022-09-08 PROCEDURE — 1160F PR REVIEW ALL MEDS BY PRESCRIBER/CLIN PHARMACIST DOCUMENTED: ICD-10-PCS | Mod: CPTII,,, | Performed by: NURSE PRACTITIONER

## 2022-09-08 PROCEDURE — 99999 PR PBB SHADOW E&M-EST. PATIENT-LVL IV: CPT | Mod: PBBFAC,,, | Performed by: NURSE PRACTITIONER

## 2022-09-08 PROCEDURE — 3008F BODY MASS INDEX DOCD: CPT | Mod: CPTII,,, | Performed by: NURSE PRACTITIONER

## 2022-09-08 PROCEDURE — 99213 OFFICE O/P EST LOW 20 MIN: CPT | Mod: S$PBB,,, | Performed by: NURSE PRACTITIONER

## 2022-09-08 PROCEDURE — 3074F PR MOST RECENT SYSTOLIC BLOOD PRESSURE < 130 MM HG: ICD-10-PCS | Mod: CPTII,,, | Performed by: NURSE PRACTITIONER

## 2022-09-08 PROCEDURE — 3074F SYST BP LT 130 MM HG: CPT | Mod: CPTII,,, | Performed by: NURSE PRACTITIONER

## 2022-09-08 PROCEDURE — 3008F PR BODY MASS INDEX (BMI) DOCUMENTED: ICD-10-PCS | Mod: CPTII,,, | Performed by: NURSE PRACTITIONER

## 2022-09-08 PROCEDURE — 1159F PR MEDICATION LIST DOCUMENTED IN MEDICAL RECORD: ICD-10-PCS | Mod: CPTII,,, | Performed by: NURSE PRACTITIONER

## 2022-09-08 PROCEDURE — 3078F PR MOST RECENT DIASTOLIC BLOOD PRESSURE < 80 MM HG: ICD-10-PCS | Mod: CPTII,,, | Performed by: NURSE PRACTITIONER

## 2022-09-08 PROCEDURE — 1159F MED LIST DOCD IN RCRD: CPT | Mod: CPTII,,, | Performed by: NURSE PRACTITIONER

## 2022-09-08 PROCEDURE — 3078F DIAST BP <80 MM HG: CPT | Mod: CPTII,,, | Performed by: NURSE PRACTITIONER

## 2022-09-08 PROCEDURE — 99999 PR PBB SHADOW E&M-EST. PATIENT-LVL IV: ICD-10-PCS | Mod: PBBFAC,,, | Performed by: NURSE PRACTITIONER

## 2022-09-08 PROCEDURE — 99213 PR OFFICE/OUTPT VISIT, EST, LEVL III, 20-29 MIN: ICD-10-PCS | Mod: S$PBB,,, | Performed by: NURSE PRACTITIONER

## 2022-09-08 PROCEDURE — 99214 OFFICE O/P EST MOD 30 MIN: CPT | Mod: PBBFAC,PN | Performed by: NURSE PRACTITIONER

## 2022-09-08 PROCEDURE — 1160F RVW MEDS BY RX/DR IN RCRD: CPT | Mod: CPTII,,, | Performed by: NURSE PRACTITIONER

## 2022-09-08 PROCEDURE — 87880 STREP A ASSAY W/OPTIC: CPT | Mod: PBBFAC,PN | Performed by: NURSE PRACTITIONER

## 2022-09-08 RX ORDER — AMOXICILLIN 500 MG/1
500 TABLET, FILM COATED ORAL EVERY 12 HOURS
Qty: 20 TABLET | Refills: 0 | Status: SHIPPED | OUTPATIENT
Start: 2022-09-08 | End: 2022-09-18

## 2022-09-08 NOTE — PROGRESS NOTES
Subjective:       Patient ID: Libia Smith is a 27 y.o. female.    Chief Complaint: OTHER (Swollen tonsils and itching on the roof of mouth )    Ms. Libia Smith is a 27 year old female, new to me, presents to the clinic with sore throat. PCP is Geni العراقي. Medical and surgical history in addition to problem list reviewed as listed below.    Sore Throat   This is a new problem. The current episode started more than 1 month ago. The problem has been gradually worsening. Neither side of throat is experiencing more pain than the other. There has been no fever. The pain is at a severity of 7/10. The pain is moderate. Associated symptoms include congestion and ear pain. Pertinent negatives include no coughing, shortness of breath or vomiting. Treatments tried: Echinacea tincture. The treatment provided mild relief.     Past Medical History:   Diagnosis Date    Asthma     Eczema     Environmental and seasonal allergies         History reviewed. No pertinent surgical history.     Family History   Problem Relation Age of Onset    Hypertension Mother     No Known Problems Father        Social History     Tobacco Use   Smoking Status Former    Types: Cigarettes, Vaping with nicotine   Smokeless Tobacco Never   Tobacco Comments    Tried smoking kirby year of college       Social History     Social History Narrative    [place]    Originally from [facility]    Single; no children    Regular exercise       Review of patient's allergies indicates:  No Known Allergies     Review of Systems   Constitutional:  Negative for chills and fever.   HENT:  Positive for congestion, ear pain and sore throat.    Respiratory:  Negative for cough and shortness of breath.    Cardiovascular: Negative.    Gastrointestinal:  Negative for nausea and vomiting.   Genitourinary: Negative.    Musculoskeletal: Negative.    Skin: Negative.    Neurological: Negative.        Objective:        Vitals:    09/08/22 1343   BP: 92/70   Pulse:  69   Temp: 98.5 °F (36.9 °C)        Physical Exam  Constitutional:       Appearance: She is well-developed.   HENT:      Head: Normocephalic and atraumatic.      Right Ear: External ear normal.      Left Ear: External ear normal.      Mouth/Throat:      Pharynx: Pharyngeal swelling and posterior oropharyngeal erythema present.   Eyes:      Extraocular Movements: Extraocular movements intact.      Conjunctiva/sclera: Conjunctivae normal.   Cardiovascular:      Rate and Rhythm: Normal rate and regular rhythm.      Heart sounds: Normal heart sounds.   Pulmonary:      Effort: Pulmonary effort is normal.      Breath sounds: Normal breath sounds.   Musculoskeletal:         General: Normal range of motion.      Cervical back: Normal range of motion and neck supple.   Skin:     General: Skin is warm and dry.   Neurological:      Mental Status: She is alert and oriented to person, place, and time.   Psychiatric:         Mood and Affect: Mood normal.         Behavior: Behavior normal.       Assessment:       No diagnosis found.    Plan:       There are no diagnoses linked to this encounter.   Medication List with Changes/Refills   Current Medications    HYDROCORTISONE (ANUSOL-HC) 25 MG SUPPOSITORY    Place 1 suppository (25 mg total) rectally 2 (two) times daily as needed for Hemorrhoids.    HYDROCORTISONE 2.5 % CREAM    Apply topically once daily.    NORELGESTROMIN-ETHINYL ESTRADIOL (ORTHO EVRA) 150-35 MCG/24 HR    Place 1 patch onto the skin once a week.   Discontinued Medications    CETIRIZINE (ZYRTEC) 10 MG TABLET    Take 10 mg by mouth once daily.    TRIAMCINOLONE ACETONIDE 0.1% (KENALOG) 0.1 % CREAM    Apply topically once daily.            No follow-ups on file.    MATI Arredondo, MSN, FNP-C

## 2022-09-13 PROBLEM — J02.9 ACUTE PHARYNGITIS: Status: ACTIVE | Noted: 2022-09-13

## 2022-11-06 ENCOUNTER — OFFICE VISIT (OUTPATIENT)
Dept: URGENT CARE | Facility: CLINIC | Age: 27
End: 2022-11-06
Payer: MEDICAID

## 2022-11-06 VITALS
HEART RATE: 66 BPM | DIASTOLIC BLOOD PRESSURE: 65 MMHG | BODY MASS INDEX: 21.86 KG/M2 | TEMPERATURE: 99 F | RESPIRATION RATE: 19 BRPM | WEIGHT: 136 LBS | OXYGEN SATURATION: 99 % | SYSTOLIC BLOOD PRESSURE: 112 MMHG | HEIGHT: 66 IN

## 2022-11-06 DIAGNOSIS — L03.113 CELLULITIS OF RIGHT HAND: ICD-10-CM

## 2022-11-06 DIAGNOSIS — L30.9 HAND ECZEMA: Primary | ICD-10-CM

## 2022-11-06 PROCEDURE — 3074F SYST BP LT 130 MM HG: CPT | Mod: CPTII,S$GLB,, | Performed by: PHYSICIAN ASSISTANT

## 2022-11-06 PROCEDURE — 1159F MED LIST DOCD IN RCRD: CPT | Mod: CPTII,S$GLB,, | Performed by: PHYSICIAN ASSISTANT

## 2022-11-06 PROCEDURE — 3008F PR BODY MASS INDEX (BMI) DOCUMENTED: ICD-10-PCS | Mod: CPTII,S$GLB,, | Performed by: PHYSICIAN ASSISTANT

## 2022-11-06 PROCEDURE — 99214 PR OFFICE/OUTPT VISIT, EST, LEVL IV, 30-39 MIN: ICD-10-PCS | Mod: S$GLB,,, | Performed by: PHYSICIAN ASSISTANT

## 2022-11-06 PROCEDURE — 99214 OFFICE O/P EST MOD 30 MIN: CPT | Mod: S$GLB,,, | Performed by: PHYSICIAN ASSISTANT

## 2022-11-06 PROCEDURE — 1159F PR MEDICATION LIST DOCUMENTED IN MEDICAL RECORD: ICD-10-PCS | Mod: CPTII,S$GLB,, | Performed by: PHYSICIAN ASSISTANT

## 2022-11-06 PROCEDURE — 1160F PR REVIEW ALL MEDS BY PRESCRIBER/CLIN PHARMACIST DOCUMENTED: ICD-10-PCS | Mod: CPTII,S$GLB,, | Performed by: PHYSICIAN ASSISTANT

## 2022-11-06 PROCEDURE — 3078F PR MOST RECENT DIASTOLIC BLOOD PRESSURE < 80 MM HG: ICD-10-PCS | Mod: CPTII,S$GLB,, | Performed by: PHYSICIAN ASSISTANT

## 2022-11-06 PROCEDURE — 3078F DIAST BP <80 MM HG: CPT | Mod: CPTII,S$GLB,, | Performed by: PHYSICIAN ASSISTANT

## 2022-11-06 PROCEDURE — 1160F RVW MEDS BY RX/DR IN RCRD: CPT | Mod: CPTII,S$GLB,, | Performed by: PHYSICIAN ASSISTANT

## 2022-11-06 PROCEDURE — 3008F BODY MASS INDEX DOCD: CPT | Mod: CPTII,S$GLB,, | Performed by: PHYSICIAN ASSISTANT

## 2022-11-06 PROCEDURE — 3074F PR MOST RECENT SYSTOLIC BLOOD PRESSURE < 130 MM HG: ICD-10-PCS | Mod: CPTII,S$GLB,, | Performed by: PHYSICIAN ASSISTANT

## 2022-11-06 RX ORDER — CEPHALEXIN 500 MG/1
500 CAPSULE ORAL EVERY 6 HOURS
Qty: 28 CAPSULE | Refills: 0 | Status: SHIPPED | OUTPATIENT
Start: 2022-11-06 | End: 2022-11-13

## 2022-11-06 RX ORDER — KETOROLAC TROMETHAMINE 30 MG/ML
30 INJECTION, SOLUTION INTRAMUSCULAR; INTRAVENOUS
Status: COMPLETED | OUTPATIENT
Start: 2022-11-06 | End: 2022-11-06

## 2022-11-06 RX ADMIN — KETOROLAC TROMETHAMINE 30 MG: 30 INJECTION, SOLUTION INTRAMUSCULAR; INTRAVENOUS at 02:11

## 2022-11-06 NOTE — PATIENT INSTRUCTIONS
PLEASE READ YOUR DISCHARGE INSTRUCTIONS ENTIRELY AS IT CONTAINS IMPORTANT INFORMATION.  A referral to dermatology was placed for you.  You should receive a phone call in the next few days to schedule an appt.  Please call [phone] to schedule an appt if have not gotten a phone call in the next few days.  A referral to dermatology was written & given to you.  Please call the number on your insurance card to contact customer service so that they may find a provider for you that is covered by your insurance.  Please try [facility] - [phone]    You received an injection of a powerful NSAID today (Toradol).  Its effects will last up to 24 hours.  Please do not take another NSAID (ie aspirin, ibuprofen, Aleve, Advil or Motrin) until this time tomorrow.  If you continue to have pain, you may take Tylenol (acetaminophen) if you are not allergic to this medication.    - Rest.    - Drink plenty of fluids.    - Tylenol or Ibuprofen as directed as needed for fever/pain.    - If you were prescribed antibiotics, please take them to completion.  - If you are female and on birth control pills - please use additional methods of contraception to prevent pregnancy while on antibiotics and for one cycle after.   - If you were prescribed a narcotic medication, a cough syrup, or a muscle relaxer, do not drive or operate heavy equipment or machinery while taking these medications, as they can cause drowsiness.   - If a referral to a specialty was made today, you should receive a phone call in the next few days to schedule an appt.  Please call [phone] to schedule an appt if have not gotten a phone call in the next few days.  - If you smoke, please stop smoking.  -You must understand that you've received an Urgent Care treatment only and that you may be released before all your medical problems are known or treated. You, the patient, will arrange for follow up care as instructed. Please arrange follow up with  your primary medical clinic as soon as possible.   - Follow up with your PCP or specialty clinic as directed in the next 1-2 weeks if not improved or as needed.  You can call [phone] to schedule an appointment with the appropriate provider.    - Please return to Urgent Care or to the Emergency Department if your symptoms worsen.    Patient aware and verbalized understanding.

## 2023-02-16 ENCOUNTER — OFFICE VISIT (OUTPATIENT)
Dept: OBSTETRICS AND GYNECOLOGY | Facility: CLINIC | Age: 28
End: 2023-02-16
Payer: MEDICAID

## 2023-02-16 VITALS
SYSTOLIC BLOOD PRESSURE: 110 MMHG | BODY MASS INDEX: 23.45 KG/M2 | WEIGHT: 145.31 LBS | DIASTOLIC BLOOD PRESSURE: 78 MMHG

## 2023-02-16 DIAGNOSIS — Z01.419 WELL WOMAN EXAM WITH ROUTINE GYNECOLOGICAL EXAM: Primary | ICD-10-CM

## 2023-02-16 DIAGNOSIS — Z12.4 ENCOUNTER FOR PAPANICOLAOU SMEAR FOR CERVICAL CANCER SCREENING: ICD-10-CM

## 2023-02-16 DIAGNOSIS — N87.0 DYSPLASIA OF CERVIX, LOW GRADE (CIN 1): ICD-10-CM

## 2023-02-16 DIAGNOSIS — Z11.3 SCREEN FOR STD (SEXUALLY TRANSMITTED DISEASE): ICD-10-CM

## 2023-02-16 PROCEDURE — 3078F DIAST BP <80 MM HG: CPT | Mod: CPTII,,, | Performed by: OBSTETRICS & GYNECOLOGY

## 2023-02-16 PROCEDURE — 88175 CYTOPATH C/V AUTO FLUID REDO: CPT | Performed by: OBSTETRICS & GYNECOLOGY

## 2023-02-16 PROCEDURE — 3008F PR BODY MASS INDEX (BMI) DOCUMENTED: ICD-10-PCS | Mod: CPTII,,, | Performed by: OBSTETRICS & GYNECOLOGY

## 2023-02-16 PROCEDURE — 3008F BODY MASS INDEX DOCD: CPT | Mod: CPTII,,, | Performed by: OBSTETRICS & GYNECOLOGY

## 2023-02-16 PROCEDURE — 99999 PR PBB SHADOW E&M-EST. PATIENT-LVL I: CPT | Mod: PBBFAC,,, | Performed by: OBSTETRICS & GYNECOLOGY

## 2023-02-16 PROCEDURE — 3078F PR MOST RECENT DIASTOLIC BLOOD PRESSURE < 80 MM HG: ICD-10-PCS | Mod: CPTII,,, | Performed by: OBSTETRICS & GYNECOLOGY

## 2023-02-16 PROCEDURE — 3074F SYST BP LT 130 MM HG: CPT | Mod: CPTII,,, | Performed by: OBSTETRICS & GYNECOLOGY

## 2023-02-16 PROCEDURE — 87591 N.GONORRHOEAE DNA AMP PROB: CPT | Performed by: OBSTETRICS & GYNECOLOGY

## 2023-02-16 PROCEDURE — 3074F PR MOST RECENT SYSTOLIC BLOOD PRESSURE < 130 MM HG: ICD-10-PCS | Mod: CPTII,,, | Performed by: OBSTETRICS & GYNECOLOGY

## 2023-02-16 PROCEDURE — 81514 NFCT DS BV&VAGINITIS DNA ALG: CPT | Performed by: OBSTETRICS & GYNECOLOGY

## 2023-02-16 PROCEDURE — 99395 PR PREVENTIVE VISIT,EST,18-39: ICD-10-PCS | Mod: S$PBB,,, | Performed by: OBSTETRICS & GYNECOLOGY

## 2023-02-16 PROCEDURE — 99999 PR PBB SHADOW E&M-EST. PATIENT-LVL I: ICD-10-PCS | Mod: PBBFAC,,, | Performed by: OBSTETRICS & GYNECOLOGY

## 2023-02-16 PROCEDURE — 99395 PREV VISIT EST AGE 18-39: CPT | Mod: S$PBB,,, | Performed by: OBSTETRICS & GYNECOLOGY

## 2023-02-16 PROCEDURE — 99211 OFF/OP EST MAY X REQ PHY/QHP: CPT | Mod: PBBFAC | Performed by: OBSTETRICS & GYNECOLOGY

## 2023-02-16 RX ORDER — CRISABOROLE 20 MG/G
1 OINTMENT TOPICAL 2 TIMES DAILY
COMMUNITY
Start: 2022-11-11

## 2023-02-16 RX ORDER — DUPILUMAB 300 MG/2ML
INJECTION, SOLUTION SUBCUTANEOUS
COMMUNITY
Start: 2023-02-15

## 2023-02-16 RX ORDER — ETONOGESTREL AND ETHINYL ESTRADIOL VAGINAL RING .015; .12 MG/D; MG/D
1 RING VAGINAL
Qty: 1 EACH | Refills: 11 | Status: SHIPPED | OUTPATIENT
Start: 2023-02-16 | End: 2024-02-16

## 2023-02-16 NOTE — PROGRESS NOTES
SUBJECTIVE:   Libia Smith is a 27 y.o. female No obstetric history on file.  for annual well woman exam. Patient's last menstrual period was 02/15/2023..      Contraception: none, was on xulane patches last year and make her emotional  Would like to explore other options    Past Medical History:   Diagnosis Date    Asthma     Eczema     Environmental and seasonal allergies      No past surgical history on file.  Social History     Socioeconomic History    Marital status: Single   Tobacco Use    Smoking status: Former     Types: Cigarettes, Vaping with nicotine    Smokeless tobacco: Never    Tobacco comments:     Tried smoking kirby year of college   Substance and Sexual Activity    Alcohol use: No   Social History Narrative    Kirby at [employer]    Originally from [facility]    Single; no children    Regular exercise     Family History   Problem Relation Age of Onset    Hypertension Mother     No Known Problems Father      OB History   Obstetric Comments   Gynhx: reg/4-5. NL cramp   Denies std   H/o abnl pap in 2019,    Pap 1/2021 ascus/hpv positive, colpo 12/2021 ROBINA 1   S/p gardasil         Current Outpatient Medications   Medication Sig Dispense Refill    hydrocortisone 2.5 % cream Apply topically once daily.      norelgestromin-ethinyl estradiol (ORTHO EVRA) 150-35 mcg/24 hr Place 1 patch onto the skin once a week. (Patient not taking: Reported on 9/8/2022) 4 patch 11     No current facility-administered medications for this visit.     Allergies: Patient has no known allergies.       ROS:  GENERAL: Denies weight gain or weight loss. Feeling well overall.   SKIN: Denies rash or lesions.   HEAD: Denies head injury or headache.   NODES: Denies enlarged lymph nodes.   CHEST: Denies chest pain or shortness of breath.   CARDIOVASCULAR: Denies palpitations or left sided chest pain.   ABDOMEN: No abdominal pain, constipation, diarrhea, nausea, vomiting or rectal bleeding.   URINARY: No frequency, dysuria, hematuria,  or burning on urination.  REPRODUCTIVE: Denies vaginal discharge, abnormal vaginal bleeding, lesions, pelvic pain  BREASTS: The patient performs breast self-examination and denies pain, lumps, or nipple discharge.   HEMATOLOGIC: No easy bruisability or excessive bleeding.  MUSCULOSKELETAL: Denies joint pain or swelling.   NEUROLOGIC: Denies syncope or weakness.   PSYCHIATRIC: Denies depression, anxiety or mood swings.      OBJECTIVE:   /78   Wt 65.9 kg (145 lb 4.5 oz)   LMP 02/15/2023   BMI 23.45 kg/m²   The patient appears well, alert, oriented x 3, in no distress.  PSYCH:  Normal, full range of affect  NECK: negative, no thyromegaly, trachea midline  SKIN: normal, good color, good turgor and no acne, striae, hirsutism  BREAST EXAM: breasts appear normal, no suspicious masses, no skin or nipple changes or axillary nodes  ABDOMEN: soft, non-tender; bowel sounds normal; no masses,  no organomegaly and no hernias, masses, or hepatosplenomegaly  GENITALIA: normal external genitalia, no erythema, no discharge  BLADDER: soft  URETHRA: normal appearing urethra with no masses, tenderness or lesions and normal urethra, normal urethral meatus  VAGINA: Normal, menstrual blood noted  CERVIX: no lesions or cervical motion tenderness  UTERUS: normal size, contour, position, consistency, mobility, non-tender  ADNEXA: no mass, fullness, tenderness      ASSESSMENT:     1. Health maintenance  -Nikki 1 12/2021: repeat pap today, s/p gardasil  -std screening  -counseled on exercise and healthy diet,  -bone health:  Discussed Vitamin D and Calcium supplementation, weight bearing exercises  2.  Discussed safety at home/school/work, healthy and balanced diet, sleep hygiene, as well as violence/weapons exposure.   3. -contraception:  Discussed options and the r/b/a of OCP, patches, nuvaring, depo injection, implants, hormonal and non-hormonal IUDs.    We discussed the advantages and disadvantages of each. We discussed the continued  use of condoms to prevent STDs.  Pt elects nuvaring, UPT negative. Advised to use this Sunday and use back up with condoms x 1 month

## 2023-02-17 LAB
C TRACH DNA SPEC QL NAA+PROBE: NOT DETECTED
N GONORRHOEA DNA SPEC QL NAA+PROBE: NOT DETECTED

## 2023-02-19 LAB
BACTERIAL VAGINOSIS DNA: POSITIVE
CANDIDA GLABRATA DNA: NEGATIVE
CANDIDA KRUSEI DNA: NEGATIVE
CANDIDA RRNA VAG QL PROBE: NEGATIVE
T VAGINALIS RRNA GENITAL QL PROBE: NEGATIVE

## 2023-02-28 ENCOUNTER — PATIENT MESSAGE (OUTPATIENT)
Dept: OBSTETRICS AND GYNECOLOGY | Facility: CLINIC | Age: 28
End: 2023-02-28
Payer: MEDICAID

## 2023-02-28 RX ORDER — METRONIDAZOLE 500 MG/1
500 TABLET ORAL EVERY 12 HOURS
Qty: 14 TABLET | Refills: 0 | Status: SHIPPED | OUTPATIENT
Start: 2023-02-28 | End: 2023-03-07

## 2023-02-28 NOTE — PROGRESS NOTES
Your culture shows bacterial vaginosis (Bv).  If you have symptoms of vaginal discharge, vaginal odor or irritation then let us know to send in medication.  You do not need to be treated if you do not have these symptoms.

## 2024-07-30 NOTE — PROGRESS NOTES
"Subjective:       Patient ID: Libia Smith is a 27 y.o. female.    Vitals:  height is 5' 6" (1.676 m) and weight is 61.7 kg (136 lb). Her temperature is 98.5 °F (36.9 °C). Her blood pressure is 112/65 and her pulse is 66. Her respiration is 19 and oxygen saturation is 99%.     Chief Complaint: Rash    Ms. Smith presents for evaluation of rash on her right hand.  She reports a history of eczema and states she used triamcinolone cream consistently for 1 year further exam on the right hand.  Her hand started to get worse a few weeks ago and she stopped using the Kenalog cream.  She reports yesterday, her hand became more red and swollen and painful.  She reports 1 low-grade fever of 100.  She has seen a dermatologist in the past, but cannot get an appointment until March.  She has tried soaking her hand with no relief.    Rash  This is a new problem. The current episode started 1 to 4 weeks ago. The problem is unchanged. The affected locations include the right hand. The rash is characterized by redness, scaling, swelling, itchiness and burning. Pertinent negatives include no congestion, cough, diarrhea, eye pain, facial edema, fatigue, fever, shortness of breath, sore throat or vomiting.     Constitution: Negative for appetite change, chills, sweating, fatigue and fever.   HENT:  Negative for ear pain, ear discharge, hearing loss, drooling, congestion, postnasal drip, sinus pain, sinus pressure and sore throat.    Neck: Negative for neck stiffness and painful lymph nodes.   Cardiovascular:  Negative for chest trauma, chest pain, leg swelling, palpitations, sob on exertion and passing out.   Eyes:  Negative for eye pain and blurred vision.   Respiratory:  Negative for chest tightness, cough, sputum production, shortness of breath and wheezing.    Gastrointestinal:  Negative for abdominal pain, nausea, vomiting and diarrhea.   Genitourinary:  Negative for dysuria, frequency and urgency.   Musculoskeletal:  " Recovery period complete, transport here, VSS   Negative for joint pain, joint swelling, muscle cramps and muscle ache.   Skin:  Positive for rash and erythema.   Allergic/Immunologic: Negative for itching and sneezing.   Neurological:  Negative for dizziness, history of vertigo, light-headedness, passing out, facial drooping, speech difficulty, coordination disturbances, loss of balance, headaches and altered mental status.   Hematologic/Lymphatic: Negative for swollen lymph nodes and easy bruising/bleeding. Does not bruise/bleed easily.   Psychiatric/Behavioral:  Negative for altered mental status.      Objective:      Physical Exam   Constitutional: She is oriented to person, place, and time. She appears well-developed.   HENT:   Head: Normocephalic and atraumatic. Head is without abrasion, without contusion and without laceration.   Ears:   Right Ear: External ear normal.   Left Ear: External ear normal.   Nose: Nose normal.   Mouth/Throat: Oropharynx is clear and moist and mucous membranes are normal.   Eyes: Conjunctivae, EOM and lids are normal. Pupils are equal, round, and reactive to light.   Neck: Trachea normal and phonation normal. Neck supple.   Cardiovascular: Normal rate, regular rhythm and normal heart sounds.   Pulmonary/Chest: Effort normal and breath sounds normal. No stridor. No respiratory distress.   Musculoskeletal: Normal range of motion.         General: Normal range of motion.        Hands:    Neurological: She is alert and oriented to person, place, and time.   Skin: Skin is warm, dry, intact and no rash. Capillary refill takes less than 2 seconds. erythema No abrasion, No burn, No bruising and No ecchymosis   Psychiatric: Her speech is normal and behavior is normal. Judgment and thought content normal.   Nursing note and vitals reviewed.            Assessment:       1. Hand eczema    2. Cellulitis of right hand          Plan:         Hand eczema  -     Ambulatory referral/consult to Dermatology    Cellulitis of right hand  -      Ambulatory referral/consult to Dermatology    Other orders  -     ketorolac injection 30 mg  -     cephALEXin (KEFLEX) 500 MG capsule; Take 1 capsule (500 mg total) by mouth every 6 (six) hours. for 7 days  Dispense: 28 capsule; Refill: 0    Will start on Keflex for secondary bacterial infection of the right hand.  She needs to follow up with Dermatology ASAP for the severe eczema of her hand.  I have put in an urgent referral in the system as well as given her written referral in case her insurance is not covered within our system.  Diagnoses and plan discussed with the patient, as well as the expected course and duration of her symptoms. All questions and concerns were addressed prior to discharge.  She was advised to follow up with her PCP within 1 week if symptoms do not improve. Emergency department precautions were given. Patient verbalized understanding and was happy with the plan of care.   Note dictated with voice recognition software, please excuse any grammatical errors.    Patient Instructions   PLEASE READ YOUR DISCHARGE INSTRUCTIONS ENTIRELY AS IT CONTAINS IMPORTANT INFORMATION.  A referral to dermatology was placed for you.  You should receive a phone call in the next few days to schedule an appt.  Please call [phone] to schedule an appt if have not gotten a phone call in the next few days.  A referral to dermatology was written & given to you.  Please call the number on your insurance card to contact customer service so that they may find a provider for you that is covered by your insurance.  Please try [facility] - [phone]    You received an injection of a powerful NSAID today (Toradol).  Its effects will last up to 24 hours.  Please do not take another NSAID (ie aspirin, ibuprofen, Aleve, Advil or Motrin) until this time tomorrow.  If you continue to have pain, you may take Tylenol (acetaminophen) if you are not allergic to this medication.    - Rest.    - Drink plenty of  fluids.    - Tylenol or Ibuprofen as directed as needed for fever/pain.    - If you were prescribed antibiotics, please take them to completion.  - If you are female and on birth control pills - please use additional methods of contraception to prevent pregnancy while on antibiotics and for one cycle after.   - If you were prescribed a narcotic medication, a cough syrup, or a muscle relaxer, do not drive or operate heavy equipment or machinery while taking these medications, as they can cause drowsiness.   - If a referral to a specialty was made today, you should receive a phone call in the next few days to schedule an appt.  Please call [phone] to schedule an appt if have not gotten a phone call in the next few days.  - If you smoke, please stop smoking.  -You must understand that you've received an Urgent Care treatment only and that you may be released before all your medical problems are known or treated. You, the patient, will arrange for follow up care as instructed. Please arrange follow up with your primary medical clinic as soon as possible.   - Follow up with your PCP or specialty clinic as directed in the next 1-2 weeks if not improved or as needed.  You can call [phone] to schedule an appointment with the appropriate provider.    - Please return to Urgent Care or to the Emergency Department if your symptoms worsen.    Patient aware and verbalized understanding.

## 2024-12-05 ENCOUNTER — PATIENT OUTREACH (OUTPATIENT)
Dept: ADMINISTRATIVE | Facility: OTHER | Age: 29
End: 2024-12-05
Payer: MEDICAID

## 2024-12-05 NOTE — PROGRESS NOTES
[facility] - Outreach Attempt    Community Health Worker left a voicemail message for 1st attempt to contact patient regarding: SDOH completion and assessment.  Community Health Worker to attempt to contact patient on:12/5/24

## 2025-07-10 ENCOUNTER — OFFICE VISIT (OUTPATIENT)
Dept: OPTOMETRY | Facility: CLINIC | Age: 30
End: 2025-07-10
Payer: COMMERCIAL

## 2025-07-10 DIAGNOSIS — Z98.890: ICD-10-CM

## 2025-07-10 DIAGNOSIS — H16.002 CORNEAL EROSION OF LEFT EYE: Primary | ICD-10-CM

## 2025-07-10 PROCEDURE — 99999 PR PBB SHADOW E&M-EST. PATIENT-LVL II: CPT | Mod: PBBFAC,,, | Performed by: OPTOMETRIST

## 2025-07-10 PROCEDURE — 99204 OFFICE O/P NEW MOD 45 MIN: CPT | Mod: S$GLB,,, | Performed by: OPTOMETRIST

## 2025-07-10 PROCEDURE — 1159F MED LIST DOCD IN RCRD: CPT | Mod: CPTII,S$GLB,, | Performed by: OPTOMETRIST

## 2025-07-10 RX ORDER — ERYTHROMYCIN 5 MG/G
OINTMENT OPHTHALMIC 3 TIMES DAILY
Qty: 1 EACH | Refills: 0 | Status: SHIPPED | OUTPATIENT
Start: 2025-07-10 | End: 2025-07-17

## 2025-07-10 NOTE — PROGRESS NOTES
HPI    Pt presents today for possible scratch OS   Pt reports was sleeping when she woke up in pain/ watery eyes/ Photophobia     Pt reports was wearing eye mask on when this occurred   GTTS: Systane Ultra     PRK surgery 2024 @ [facility] in [place]   Last edited by Benita Marks on 7/10/2025 10:18 AM.            Assessment /Plan     For exam results, see Encounter Report.    Corneal erosion of left eye  -     erythromycin (ROMYCIN) ophthalmic ointment; Place into the left eye 3 (three) times daily. for 7 days  Dispense: 1 each; Refill: 0  Status post advanced surface ablation photorefractive keratectomy (PRK)  -Reviewed RCE and/or sleeping with eyes partially opened as possible sources  -Erythromycin Mayra TID x 1 wk  -if recurrent will consider SupraK referral [facility]      RTC annual